# Patient Record
Sex: MALE | Race: OTHER | HISPANIC OR LATINO | URBAN - METROPOLITAN AREA
[De-identification: names, ages, dates, MRNs, and addresses within clinical notes are randomized per-mention and may not be internally consistent; named-entity substitution may affect disease eponyms.]

---

## 2019-09-20 ENCOUNTER — INPATIENT (INPATIENT)
Facility: HOSPITAL | Age: 69
LOS: 0 days | Discharge: HOME | End: 2019-09-21
Attending: INTERNAL MEDICINE | Admitting: INTERNAL MEDICINE
Payer: MEDICARE

## 2019-09-20 VITALS
HEART RATE: 59 BPM | RESPIRATION RATE: 18 BRPM | TEMPERATURE: 98 F | OXYGEN SATURATION: 100 % | SYSTOLIC BLOOD PRESSURE: 138 MMHG | DIASTOLIC BLOOD PRESSURE: 69 MMHG

## 2019-09-20 DIAGNOSIS — I10 ESSENTIAL (PRIMARY) HYPERTENSION: ICD-10-CM

## 2019-09-20 DIAGNOSIS — Z29.9 ENCOUNTER FOR PROPHYLACTIC MEASURES, UNSPECIFIED: ICD-10-CM

## 2019-09-20 DIAGNOSIS — N17.9 ACUTE KIDNEY FAILURE, UNSPECIFIED: ICD-10-CM

## 2019-09-20 DIAGNOSIS — E78.5 HYPERLIPIDEMIA, UNSPECIFIED: ICD-10-CM

## 2019-09-20 DIAGNOSIS — N13.2 HYDRONEPHROSIS WITH RENAL AND URETERAL CALCULOUS OBSTRUCTION: ICD-10-CM

## 2019-09-20 LAB
ALBUMIN SERPL ELPH-MCNC: 4.1 G/DL — SIGNIFICANT CHANGE UP (ref 3.3–5)
ALP SERPL-CCNC: 89 U/L — SIGNIFICANT CHANGE UP (ref 40–120)
ALT FLD-CCNC: 13 U/L — SIGNIFICANT CHANGE UP (ref 4–41)
ANION GAP SERPL CALC-SCNC: 13 MMO/L — SIGNIFICANT CHANGE UP (ref 7–14)
APPEARANCE UR: CLEAR — SIGNIFICANT CHANGE UP
AST SERPL-CCNC: 14 U/L — SIGNIFICANT CHANGE UP (ref 4–40)
BACTERIA # UR AUTO: NEGATIVE — SIGNIFICANT CHANGE UP
BASE EXCESS BLDV CALC-SCNC: 4.3 MMOL/L — SIGNIFICANT CHANGE UP
BASOPHILS # BLD AUTO: 0.04 K/UL — SIGNIFICANT CHANGE UP (ref 0–0.2)
BASOPHILS NFR BLD AUTO: 0.4 % — SIGNIFICANT CHANGE UP (ref 0–2)
BILIRUB SERPL-MCNC: 0.5 MG/DL — SIGNIFICANT CHANGE UP (ref 0.2–1.2)
BILIRUB UR-MCNC: NEGATIVE — SIGNIFICANT CHANGE UP
BLOOD GAS VENOUS - CREATININE: 1.82 MG/DL — HIGH (ref 0.5–1.3)
BLOOD UR QL VISUAL: SIGNIFICANT CHANGE UP
BUN SERPL-MCNC: 27 MG/DL — HIGH (ref 7–23)
CALCIUM SERPL-MCNC: 9.8 MG/DL — SIGNIFICANT CHANGE UP (ref 8.4–10.5)
CHLORIDE BLDV-SCNC: 105 MMOL/L — SIGNIFICANT CHANGE UP (ref 96–108)
CHLORIDE SERPL-SCNC: 98 MMOL/L — SIGNIFICANT CHANGE UP (ref 98–107)
CO2 SERPL-SCNC: 27 MMOL/L — SIGNIFICANT CHANGE UP (ref 22–31)
COLOR SPEC: SIGNIFICANT CHANGE UP
CREAT ?TM UR-MCNC: 111.7 MG/DL — SIGNIFICANT CHANGE UP
CREAT SERPL-MCNC: 1.8 MG/DL — HIGH (ref 0.5–1.3)
EOSINOPHIL # BLD AUTO: 0.08 K/UL — SIGNIFICANT CHANGE UP (ref 0–0.5)
EOSINOPHIL NFR BLD AUTO: 0.8 % — SIGNIFICANT CHANGE UP (ref 0–6)
GAS PNL BLDV: 136 MMOL/L — SIGNIFICANT CHANGE UP (ref 136–146)
GLUCOSE BLDV-MCNC: 92 MG/DL — SIGNIFICANT CHANGE UP (ref 70–99)
GLUCOSE SERPL-MCNC: 98 MG/DL — SIGNIFICANT CHANGE UP (ref 70–99)
GLUCOSE UR-MCNC: NEGATIVE — SIGNIFICANT CHANGE UP
HCO3 BLDV-SCNC: 26 MMOL/L — SIGNIFICANT CHANGE UP (ref 20–27)
HCT VFR BLD CALC: 45.2 % — SIGNIFICANT CHANGE UP (ref 39–50)
HCT VFR BLDV CALC: 41.3 % — SIGNIFICANT CHANGE UP (ref 39–51)
HGB BLD-MCNC: 13.8 G/DL — SIGNIFICANT CHANGE UP (ref 13–17)
HGB BLDV-MCNC: 13.5 G/DL — SIGNIFICANT CHANGE UP (ref 13–17)
HYALINE CASTS # UR AUTO: NEGATIVE — SIGNIFICANT CHANGE UP
IMM GRANULOCYTES NFR BLD AUTO: 0.3 % — SIGNIFICANT CHANGE UP (ref 0–1.5)
KETONES UR-MCNC: NEGATIVE — SIGNIFICANT CHANGE UP
LACTATE BLDV-MCNC: 1.1 MMOL/L — SIGNIFICANT CHANGE UP (ref 0.5–2)
LEUKOCYTE ESTERASE UR-ACNC: SIGNIFICANT CHANGE UP
LYMPHOCYTES # BLD AUTO: 1.29 K/UL — SIGNIFICANT CHANGE UP (ref 1–3.3)
LYMPHOCYTES # BLD AUTO: 13.1 % — SIGNIFICANT CHANGE UP (ref 13–44)
MCHC RBC-ENTMCNC: 27.1 PG — SIGNIFICANT CHANGE UP (ref 27–34)
MCHC RBC-ENTMCNC: 30.5 % — LOW (ref 32–36)
MCV RBC AUTO: 88.6 FL — SIGNIFICANT CHANGE UP (ref 80–100)
MONOCYTES # BLD AUTO: 0.82 K/UL — SIGNIFICANT CHANGE UP (ref 0–0.9)
MONOCYTES NFR BLD AUTO: 8.3 % — SIGNIFICANT CHANGE UP (ref 2–14)
NEUTROPHILS # BLD AUTO: 7.58 K/UL — HIGH (ref 1.8–7.4)
NEUTROPHILS NFR BLD AUTO: 77.1 % — HIGH (ref 43–77)
NITRITE UR-MCNC: NEGATIVE — SIGNIFICANT CHANGE UP
NRBC # FLD: 0 K/UL — SIGNIFICANT CHANGE UP (ref 0–0)
PCO2 BLDV: 58 MMHG — HIGH (ref 41–51)
PH BLDV: 7.33 PH — SIGNIFICANT CHANGE UP (ref 7.32–7.43)
PH UR: 5.5 — SIGNIFICANT CHANGE UP (ref 5–8)
PLATELET # BLD AUTO: 321 K/UL — SIGNIFICANT CHANGE UP (ref 150–400)
PMV BLD: 10.9 FL — SIGNIFICANT CHANGE UP (ref 7–13)
PO2 BLDV: < 24 MMHG — LOW (ref 35–40)
POTASSIUM BLDV-SCNC: 4.5 MMOL/L — SIGNIFICANT CHANGE UP (ref 3.4–4.5)
POTASSIUM SERPL-MCNC: 4.7 MMOL/L — SIGNIFICANT CHANGE UP (ref 3.5–5.3)
POTASSIUM SERPL-SCNC: 4.7 MMOL/L — SIGNIFICANT CHANGE UP (ref 3.5–5.3)
PROT SERPL-MCNC: 7.6 G/DL — SIGNIFICANT CHANGE UP (ref 6–8.3)
PROT UR-MCNC: 10 — SIGNIFICANT CHANGE UP
RBC # BLD: 5.1 M/UL — SIGNIFICANT CHANGE UP (ref 4.2–5.8)
RBC # FLD: 13.2 % — SIGNIFICANT CHANGE UP (ref 10.3–14.5)
RBC CASTS # UR COMP ASSIST: HIGH (ref 0–?)
SAO2 % BLDV: 30 % — LOW (ref 60–85)
SODIUM SERPL-SCNC: 138 MMOL/L — SIGNIFICANT CHANGE UP (ref 135–145)
SODIUM UR-SCNC: 89 MMOL/L — SIGNIFICANT CHANGE UP
SP GR SPEC: 1.02 — SIGNIFICANT CHANGE UP (ref 1–1.04)
SQUAMOUS # UR AUTO: SIGNIFICANT CHANGE UP
UROBILINOGEN FLD QL: NORMAL — SIGNIFICANT CHANGE UP
WBC # BLD: 9.84 K/UL — SIGNIFICANT CHANGE UP (ref 3.8–10.5)
WBC # FLD AUTO: 9.84 K/UL — SIGNIFICANT CHANGE UP (ref 3.8–10.5)
WBC UR QL: HIGH (ref 0–?)

## 2019-09-20 PROCEDURE — 74176 CT ABD & PELVIS W/O CONTRAST: CPT | Mod: 26

## 2019-09-20 RX ORDER — FOLIC ACID 0.8 MG
1 TABLET ORAL
Qty: 0 | Refills: 0 | DISCHARGE

## 2019-09-20 RX ORDER — ATORVASTATIN CALCIUM 80 MG/1
1 TABLET, FILM COATED ORAL
Qty: 0 | Refills: 0 | DISCHARGE

## 2019-09-20 RX ORDER — ASPIRIN/CALCIUM CARB/MAGNESIUM 324 MG
1 TABLET ORAL
Qty: 0 | Refills: 0 | DISCHARGE

## 2019-09-20 RX ORDER — INFLUENZA VIRUS VACCINE 15; 15; 15; 15 UG/.5ML; UG/.5ML; UG/.5ML; UG/.5ML
0.5 SUSPENSION INTRAMUSCULAR ONCE
Refills: 0 | Status: COMPLETED | OUTPATIENT
Start: 2019-09-20 | End: 2019-09-21

## 2019-09-20 RX ORDER — CHOLECALCIFEROL (VITAMIN D3) 125 MCG
1 CAPSULE ORAL
Qty: 0 | Refills: 0 | DISCHARGE

## 2019-09-20 RX ORDER — HEPARIN SODIUM 5000 [USP'U]/ML
5000 INJECTION INTRAVENOUS; SUBCUTANEOUS EVERY 8 HOURS
Refills: 0 | Status: DISCONTINUED | OUTPATIENT
Start: 2019-09-20 | End: 2019-09-21

## 2019-09-20 RX ORDER — METOPROLOL TARTRATE 50 MG
1 TABLET ORAL
Qty: 0 | Refills: 0 | DISCHARGE

## 2019-09-20 RX ORDER — ASPIRIN/CALCIUM CARB/MAGNESIUM 324 MG
81 TABLET ORAL DAILY
Refills: 0 | Status: DISCONTINUED | OUTPATIENT
Start: 2019-09-20 | End: 2019-09-21

## 2019-09-20 RX ORDER — PANTOPRAZOLE SODIUM 20 MG/1
40 TABLET, DELAYED RELEASE ORAL
Refills: 0 | Status: DISCONTINUED | OUTPATIENT
Start: 2019-09-20 | End: 2019-09-21

## 2019-09-20 RX ORDER — SODIUM CHLORIDE 9 MG/ML
1000 INJECTION INTRAMUSCULAR; INTRAVENOUS; SUBCUTANEOUS
Refills: 0 | Status: DISCONTINUED | OUTPATIENT
Start: 2019-09-20 | End: 2019-09-21

## 2019-09-20 RX ORDER — AMLODIPINE BESYLATE 2.5 MG/1
1 TABLET ORAL
Qty: 0 | Refills: 0 | DISCHARGE

## 2019-09-20 RX ORDER — ATORVASTATIN CALCIUM 80 MG/1
10 TABLET, FILM COATED ORAL AT BEDTIME
Refills: 0 | Status: DISCONTINUED | OUTPATIENT
Start: 2019-09-20 | End: 2019-09-21

## 2019-09-20 RX ORDER — LISINOPRIL 2.5 MG/1
40 TABLET ORAL DAILY
Refills: 0 | Status: DISCONTINUED | OUTPATIENT
Start: 2019-09-20 | End: 2019-09-20

## 2019-09-20 RX ORDER — METOPROLOL TARTRATE 50 MG
100 TABLET ORAL DAILY
Refills: 0 | Status: DISCONTINUED | OUTPATIENT
Start: 2019-09-20 | End: 2019-09-21

## 2019-09-20 RX ORDER — AMLODIPINE BESYLATE 2.5 MG/1
10 TABLET ORAL DAILY
Refills: 0 | Status: DISCONTINUED | OUTPATIENT
Start: 2019-09-20 | End: 2019-09-21

## 2019-09-20 RX ADMIN — ATORVASTATIN CALCIUM 10 MILLIGRAM(S): 80 TABLET, FILM COATED ORAL at 21:09

## 2019-09-20 RX ADMIN — Medication 81 MILLIGRAM(S): at 18:16

## 2019-09-20 RX ADMIN — SODIUM CHLORIDE 75 MILLILITER(S): 9 INJECTION INTRAMUSCULAR; INTRAVENOUS; SUBCUTANEOUS at 14:41

## 2019-09-20 RX ADMIN — HEPARIN SODIUM 5000 UNIT(S): 5000 INJECTION INTRAVENOUS; SUBCUTANEOUS at 21:09

## 2019-09-20 RX ADMIN — Medication 100 MILLIGRAM(S): at 18:16

## 2019-09-20 RX ADMIN — AMLODIPINE BESYLATE 10 MILLIGRAM(S): 2.5 TABLET ORAL at 18:16

## 2019-09-20 NOTE — ED PROVIDER NOTE - CLINICAL SUMMARY MEDICAL DECISION MAKING FREE TEXT BOX
70 yo M PMHx HTN, HLD, s/p TURP 2010, sent to ED by Dr. Shepherd (nephrologist) for US finding of left hydronephrosis. pt asymptomatic. pt afebrile, VSS. Exam is normal. Will send ua, labs, and obtain CTAP without. Per Dr. Shepherd, will plan to admit patient. 68 yo M PMHx HTN, HLD, s/p TURP 2010, sent to ED by Dr. Shepherd (nephrologist) for US finding of left hydronephrosis. pt asymptomatic. pt afebrile, VSS. Exam is normal. Will send ua, labs, and obtain CTAP without. Plan for possible admission

## 2019-09-20 NOTE — CONSULT NOTE ADULT - SUBJECTIVE AND OBJECTIVE BOX
[facility]   MD YADIRA LINN, DO DAJUAN FARMER, AYE GROSSMAN    TEL:  OFFICE: [phone]  DR RIOS CELL: [phone]  DR. MARTIN CELL: [phone]  DR. FARMER CELL: [phone]  DURGA JORGENSEN CELL: [phone]    HPI:  68 yo M with h/o HTN, BPH s/p TURP with primary urologist Dr. Phillips and known nephrolithiasis for many years sent by  for ROXANN and left hydro noted on outpatient US. baseline ~1 in 2019 referred by PMD for nephrology eval when recent lab showed scr 1.8, UA relatively blend. HTN taking rampril which was stopped last week by dr. rios.   currently pt denied any complaints.     Allergies:  No Known Allergies      PAST MEDICAL & SURGICAL HISTORY:      Home Medications Reviewed    Hospital Medications:   MEDICATIONS  (STANDING):      SOCIAL HISTORY:  Denies ETOh, Smoking,     FAMILY HISTORY:      REVIEW OF SYSTEMS:  CONSTITUTIONAL: No weakness, fevers or chills  EYES/ENT: No visual changes;  No vertigo or throat pain   NECK: No pain or stiffness  RESPIRATORY: No cough, wheezing, hemoptysis; No shortness of breath  CARDIOVASCULAR: No chest pain or palpitations.  GASTROINTESTINAL: No abdominal or epigastric pain. No nausea, vomiting, or hematemesis; No diarrhea or constipation. No melena or hematochezia.  GENITOURINARY: No dysuria, frequency, foamy urine, urinary urgency, incontinence or hematuria  NEUROLOGICAL: No numbness or weakness  SKIN: No itching, burning, rashes, or lesions   VASCULAR: No bilateral lower extremity edema.   All other review of systems is negative unless indicated above.    VITALS:  T(F): 97.9 (19 @ 09:20), Max: 97.9 (19 @ 09:20)  HR: 57 (19 @ 10:05)  BP: 134/63 (19 @ 10:05)  RR: 18 (19 @ 10:05)  SpO2: 98% (19 @ 10:05)  Wt(kg): --        PHYSICAL EXAM:  Constitutional: NAD  HEENT: anicteric sclera, oropharynx clear, MMM  Neck: No JVD  Respiratory: CTAB, no wheezes, rales or rhonchi  Cardiovascular: S1, S2, RRR  Gastrointestinal: BS+, soft, NT/ND  Extremities: No cyanosis or clubbing. No peripheral edema  Neurological: A/O x 3, no focal deficits  Psychiatric: Normal mood, normal affect  : No CVA tenderness. No eng.   Skin: No rashes      LABS:      138  |  98  |  27<H>  ----------------------------<  98  4.7   |  27  |  1.80<H>    Ca    9.8      20 Sep 2019 10:00    TPro  7.6  /  Alb  4.1  /  TBili  0.5  /  DBili      /  AST  14  /  ALT  13  /  AlkPhos  89      Creatinine Trend: 1.80 <--                        13.8   9.84  )-----------( 321      ( 20 Sep 2019 10:00 )             45.2     Urine Studies:  Urinalysis Basic - ( 20 Sep 2019 10:00 )    Color: LIGHT YELLOW / Appearance: CLEAR / S.018 / pH: 5.5  Gluc: NEGATIVE / Ketone: NEGATIVE  / Bili: NEGATIVE / Urobili: NORMAL   Blood: TRACE / Protein: 10 / Nitrite: NEGATIVE   Leuk Esterase: MODERATE / RBC: 6-10 / WBC 26-50   Sq Epi: OCC / Non Sq Epi:  / Bacteria: NEGATIVE          RADIOLOGY & ADDITIONAL STUDIES:

## 2019-09-20 NOTE — CONSULT NOTE ADULT - SUBJECTIVE AND OBJECTIVE BOX
HPI  This is 68 yo M with h/o BPH s/p TURP with primary urologist Dr. Phillips and known nephrolithiasis followed with surveillance presenting with elevated creatinine with L sided 2.7cm UPJ stone.  Pt was seen by primary care who noticed elevated Cr of 1.8 (from baseline of 1.0) who referred to nephrologist who noted hydronephrosis on left after renal sono.   Pt asymptomatic, no fevers chills, n/v/d, SOB, Pain, urinary symptoms.        PAST MEDICAL & SURGICAL HISTORY:      MEDICATIONS  (STANDING):    MEDICATIONS  (PRN):      FAMILY HISTORY:      Allergies    No Known Allergies    Intolerances        SOCIAL HISTORY:    REVIEW OF SYSTEMS: Otherwise negative as stated in HPI    Physical Exam  Vital signs  T(C): 36.6 (19 @ 09:20), Max: 36.6 (19 @ 09:20)  HR: 57 (19 @ 10:05)  BP: 134/63 (19 @ 10:05)  SpO2: 98% (19 @ 10:05)  Wt(kg): --    Output      Gen:  NAD    Pulm:  No respiratory distress  	  CV:  RRR    GI:  S/ND/NT No CVA          MSK: Full strength and ROM      LABS:       @ 10:00    WBC 9.84  / Hct 45.2  / SCr 1.80         138  |  98  |  27<H>  ----------------------------<  98  4.7   |  27  |  1.80<H>    Ca    9.8      20 Sep 2019 10:00    TPro  7.6  /  Alb  4.1  /  TBili  0.5  /  DBili  x   /  AST  14  /  ALT  13  /  AlkPhos  89        Urinalysis Basic - ( 20 Sep 2019 10:00 )    Color: LIGHT YELLOW / Appearance: CLEAR / S.018 / pH: 5.5  Gluc: NEGATIVE / Ketone: NEGATIVE  / Bili: NEGATIVE / Urobili: NORMAL   Blood: TRACE / Protein: 10 / Nitrite: NEGATIVE   Leuk Esterase: MODERATE / RBC: 6-10 / WBC 26-50   Sq Epi: OCC / Non Sq Epi: x / Bacteria: NEGATIVE        Urine Cx: pend      RADIOLOGY:  < from: CT Abdomen and Pelvis No Cont (19 @ 10:28) >  A 2.7 cm calculus within the left UPJ resulting in severe left   hydronephrosis.  An indeterminate low-attenuation lesion arising from the lower pole of   right kidney for which a renal ultrasound is advised on outpatient basis.    < end of copied text >

## 2019-09-20 NOTE — H&P ADULT - PROBLEM SELECTOR PLAN 2
Trend BUN/Cr  2.7cm kidney stone  asymptomatic  F/U and trend BUN/Cr for now  nehrology eval appreciated   IV hydration  Hold ACEi for now   can resume when level stable

## 2019-09-20 NOTE — ED PROVIDER NOTE - OBJECTIVE STATEMENT
68 yo M PMHx HTN, HLD, s/p TURP 2010, presents to ED, sent by nephrologist for an ultrasound 68 yo M PMHx HTN, HLD, s/p TURP 2010, presents to ED sent by Dr. Antoine Shepherd (nephrologist) for a left sided hydronephrosis (US done 3 days ago). Pt also noted to have elevated creatinine of 1.88 (up from 1.0 6 months ago). Pt is asymptomatic. States he is making adequate urine. Denies any abdominal pain, dysuria, hematuria, n/v/d, cp, sob, fever, or back  pain. 70 yo M PMHx HTN, HLD, s/p TURP 2010, presents to ED sent by Dr. Antoine Shepherd (nephrologist) for a left sided hydronephrosis (US done 3 days ago). Pt also noted to have elevated creatinine of 1.88 (up from 1.0 6 months ago). Pt is currently asymptomatic however, did endorse that he had vague abd and lower back pain about 3 days ago that resolves on its own. States he is making adequate urine. Denies any abdominal pain, dysuria, hematuria, n/v/d, cp, sob, fever, or back  pain.

## 2019-09-20 NOTE — CONSULT NOTE ADULT - ASSESSMENT
70 yo M with h/o HTN, BPH s/p TURP sent by nephrologist for roxann and left hydro noted on US    ROXANN  scr ~1 in feb now 1.8  ? etiology of ROXANN  US with left hydro, CT A/P w/o done 2.7 cm calculus within the left UPJ resulting in severe left hydronephrosis.   following, plan for outpatient follow up  only left side hydro does not explain ROXANN  check urine cr, na   ramipril on hold since last week  seems euvolemic on exam will give a trail of gentle hydration with NS@ 75cc/hr x 12hrs  monitor bmp  avoid NSAIDs, nephrotoxic agents      HTN  controlled  monitor    proteinuria/hematuria  likely sec to kidney stone  check urine p/c ratio  monitor 68 yo M with h/o HTN, BPH s/p TURP sent by nephrologist for roxann and left hydro noted on US    ROXANN  scr ~1 in feb now 1.8  ? etiology of ROXANN  US with left hydro, CT A/P w/o done 2.7 cm calculus within the left UPJ resulting in severe left hydronephrosis.   following  check urine cr, na   ramipril on hold since last week  seems euvolemic on exam will give a trail of gentle hydration with NS@ 75cc/hr x 12hrs  monitor bmp  avoid NSAIDs, nephrotoxic agents      HTN  controlled  monitor    proteinuria/hematuria  likely sec to kidney stone  check urine p/c ratio  monitor 70 yo M with h/o HTN, BPH s/p TURP sent by nephrologist for roxann and left hydro noted on US    ROXANN  scr ~1 in feb 2019 now elevated to 1.8  ? etiology of ROXANN  US with left hydro, CT A/P w/o done 2.7 cm calculus within the left UPJ resulting in severe left hydronephrosis.   following  check urine cr, na   ramipril on hold since last week  seems euvolemic on exam will give a trail of gentle hydration with NS@ 75cc/hr x 12hrs  monitor bmp  avoid NSAIDs, nephrotoxic agents      HTN  controlled  monitor    Proteinuria/hematuria  likely sec to kidney stone  check urine p/c ratio  monitor

## 2019-09-20 NOTE — ED PROVIDER NOTE - ATTENDING CONTRIBUTION TO CARE
Dr. Pinzon:  I have personally performed a face to face bedside history and physical examination of this patient. I have discussed the history, examination, review of systems, assessment and plan of management with the resident. I have reviewed the electronic medical record and amended it to reflect my history, review of systems, physical exam, assessment and plan.    69M h/o HTN, HLD, s/p TURP 2010, sent in by nephrologist (Dr. Shepherd) for concern of "left kidney blocked" on outpatient ultrasound last week.  Also, told creatinine 1.88 and previously was 1.0 six months prior.  Patient denies any symptoms.      Exam:  - nad  - rrr  - ctab   -abd soft ntnd    A/P  - elevated creatinine and ?hydronephrosis  - basic labs, US renal, ua, urine culture

## 2019-09-20 NOTE — ED PROVIDER NOTE - PROGRESS NOTE DETAILS
Spoke with Dr. Shepherd (nephrologist) who requests a CTAP non-con and requests admission to Dr. Mendoza. Will contact Dr. Mendoza once creatinine returns. Spoke with Dr. Shepherd (nephrologist) who requests a CTAP non-con and possible admission Sukumar Dia PGY1 - urologist came to see patient. awaiting final recs Sukumar Dia PGY1 - discussed results of labs and CT w/ patient. pt comfortable and does not have pain. Urologist came to see patient. awaiting final recs

## 2019-09-20 NOTE — H&P ADULT - ASSESSMENT
Pt is a 70 yo M PMHx HTN, HLD, s/p TURP 2010, presents to ED sent by Dr. Antoine Shepherd (nephrologist) for a left sided hydronephrosis (US done 3 days ago). Pt also noted to have elevated creatinine of 1.88 (up from 1.0 6 months ago). Pt is currently asymptomatic however, did endorse that he had vague abd and lower back pain about 3 days ago that resolves on its own. States he is making adequate urine. Denies any abdominal pain, dysuria, hematuria, n/v/d, cp, sob, fever, or back  pain. found to have 2.7cm Kidney stone in CT imaging

## 2019-09-20 NOTE — ED ADULT TRIAGE NOTE - CHIEF COMPLAINT QUOTE
Pt sent in by his nephrologist for elevated renal function. pt states had u/s this past Tuesday showing possible left kidney blockage.  Pt denies any pain or any difficulty urinating. Pt with HX of TURP 2010.

## 2019-09-20 NOTE — ED PROVIDER NOTE - CARE PLAN
Principal Discharge DX:	Hydronephrosis with urinary obstruction due to renal calculus  Secondary Diagnosis:	ROXANN (acute kidney injury)

## 2019-09-20 NOTE — H&P ADULT - NSHPPHYSICALEXAM_GEN_ALL_CORE
Vital Signs Last 24 Hrs  T(C): 36.6 (20 Sep 2019 09:20), Max: 36.6 (20 Sep 2019 09:20)  T(F): 97.9 (20 Sep 2019 09:20), Max: 97.9 (20 Sep 2019 09:20)  HR: 57 (20 Sep 2019 10:05) (57 - 59)  BP: 134/63 (20 Sep 2019 10:05) (134/63 - 138/69)  BP(mean): --  RR: 18 (20 Sep 2019 10:05) (18 - 18)  SpO2: 98% (20 Sep 2019 10:05) (98% - 100%)    Appearance: Normal	  HEENT:   Normal oral mucosa, PERRL, EOMI	  Lymphatic: No lymphadenopathy , no edema  Cardiovascular: Normal S1 S2, No JVD, No murmurs , Peripheral pulses palpable 2+ bilaterally  Respiratory: Lungs clear to auscultation, normal effort 	  Gastrointestinal:  Soft, Non-tender, + BS	  Skin: No rashes, No ecchymoses, No cyanosis, warm to touch  Musculoskeletal: Normal range of motion, normal strength  Psychiatry:  Mood & affect appropriate  Ext: No edema

## 2019-09-20 NOTE — H&P ADULT - NSHPLABSRESULTS_GEN_ALL_CORE
13.8   9.84  )-----------( 321      ( 20 Sep 2019 10:00 )             45.2               09    138  |  98  |  27<H>  ----------------------------<  98  4.7   |  27  |  1.80<H>    Ca    9.8      20 Sep 2019 10:00    TPro  7.6  /  Alb  4.1  /  TBili  0.5  /  DBili  x   /  AST  14  /  ALT  13  /  AlkPhos  89                         Urinalysis Basic - ( 20 Sep 2019 10:00 )    Color: LIGHT YELLOW / Appearance: CLEAR / S.018 / pH: 5.5  Gluc: NEGATIVE / Ketone: NEGATIVE  / Bili: NEGATIVE / Urobili: NORMAL   Blood: TRACE / Protein: 10 / Nitrite: NEGATIVE   Leuk Esterase: MODERATE / RBC: 6-10 / WBC 26-50   Sq Epi: OCC / Non Sq Epi: x / Bacteria: NEGATIVE    < from: CT Abdomen and Pelvis No Cont (19 @ 10:28) >    IMPRESSION:     A 2.7 cm calculus within the left UPJ resulting in severe left   hydronephrosis.  An indeterminate low-attenuation lesion arising from the lower pole of   right kidney for which a renal ultrasound is advised on outpatient basis.

## 2019-09-20 NOTE — H&P ADULT - HISTORY OF PRESENT ILLNESS
Pt is a 68 yo M PMHx HTN, HLD, s/p TURP 2010, presents to ED sent by Dr. Antoine Shepherd (nephrologist) for a left sided hydronephrosis (US done 3 days ago). Pt also noted to have elevated creatinine of 1.88 (up from 1.0 6 months ago). Pt is currently asymptomatic however, did endorse that he had vague abd and lower back pain about 3 days ago that resolves on its own. States he is making adequate urine. Denies any abdominal pain, dysuria, hematuria, n/v/d, cp, sob, fever, or back  pain. found to have 2.7cm Kidney stone in CT imaging

## 2019-09-20 NOTE — ED ADULT NURSE NOTE - OBJECTIVE STATEMENT
Pt presents to rm 23, A&Ox4, ambulatory w/o assistance, pmhx TURP in 2010, sent by PCP for US result that shows "kidney blockage." Pt states he has no symptoms, states no discharge or blood in urine. Denies abd pain, chest pain, shortness of breath, palpitations, diaphoresis, headaches, fevers, dizziness, nausea, vomiting, diarrhea, or urinary symptoms at this time. IV established in left AC with a 20G, labs drawn and sent, call bell in reach, warm blanket provided, bed in lowest position, side rails up x2, MD evaluation in progress. Will continue to monitor.

## 2019-09-21 ENCOUNTER — TRANSCRIPTION ENCOUNTER (OUTPATIENT)
Age: 69
End: 2019-09-21

## 2019-09-21 VITALS
TEMPERATURE: 98 F | OXYGEN SATURATION: 98 % | RESPIRATION RATE: 17 BRPM | DIASTOLIC BLOOD PRESSURE: 66 MMHG | HEART RATE: 51 BPM | SYSTOLIC BLOOD PRESSURE: 126 MMHG

## 2019-09-21 LAB
ALBUMIN SERPL ELPH-MCNC: 3.9 G/DL — SIGNIFICANT CHANGE UP (ref 3.3–5)
ALP SERPL-CCNC: 84 U/L — SIGNIFICANT CHANGE UP (ref 40–120)
ALT FLD-CCNC: 12 U/L — SIGNIFICANT CHANGE UP (ref 4–41)
ANION GAP SERPL CALC-SCNC: 14 MMO/L — SIGNIFICANT CHANGE UP (ref 7–14)
AST SERPL-CCNC: 14 U/L — SIGNIFICANT CHANGE UP (ref 4–40)
BACTERIA UR CULT: SIGNIFICANT CHANGE UP
BILIRUB SERPL-MCNC: 0.6 MG/DL — SIGNIFICANT CHANGE UP (ref 0.2–1.2)
BUN SERPL-MCNC: 26 MG/DL — HIGH (ref 7–23)
CALCIUM SERPL-MCNC: 9.7 MG/DL — SIGNIFICANT CHANGE UP (ref 8.4–10.5)
CHLORIDE SERPL-SCNC: 100 MMOL/L — SIGNIFICANT CHANGE UP (ref 98–107)
CHOLEST SERPL-MCNC: 116 MG/DL — LOW (ref 120–199)
CO2 SERPL-SCNC: 24 MMOL/L — SIGNIFICANT CHANGE UP (ref 22–31)
CREAT SERPL-MCNC: 1.72 MG/DL — HIGH (ref 0.5–1.3)
GLUCOSE SERPL-MCNC: 91 MG/DL — SIGNIFICANT CHANGE UP (ref 70–99)
HBA1C BLD-MCNC: 5.8 % — HIGH (ref 4–5.6)
HCT VFR BLD CALC: 42.4 % — SIGNIFICANT CHANGE UP (ref 39–50)
HCV AB S/CO SERPL IA: 0.28 S/CO — SIGNIFICANT CHANGE UP (ref 0–0.99)
HCV AB SERPL-IMP: SIGNIFICANT CHANGE UP
HDLC SERPL-MCNC: 35 MG/DL — SIGNIFICANT CHANGE UP (ref 35–55)
HGB BLD-MCNC: 13.2 G/DL — SIGNIFICANT CHANGE UP (ref 13–17)
LIPID PNL WITH DIRECT LDL SERPL: 70 MG/DL — SIGNIFICANT CHANGE UP
MCHC RBC-ENTMCNC: 27 PG — SIGNIFICANT CHANGE UP (ref 27–34)
MCHC RBC-ENTMCNC: 31.1 % — LOW (ref 32–36)
MCV RBC AUTO: 86.7 FL — SIGNIFICANT CHANGE UP (ref 80–100)
NRBC # FLD: 0 K/UL — SIGNIFICANT CHANGE UP (ref 0–0)
PLATELET # BLD AUTO: 287 K/UL — SIGNIFICANT CHANGE UP (ref 150–400)
PMV BLD: 10.9 FL — SIGNIFICANT CHANGE UP (ref 7–13)
POTASSIUM SERPL-MCNC: 4.9 MMOL/L — SIGNIFICANT CHANGE UP (ref 3.5–5.3)
POTASSIUM SERPL-SCNC: 4.9 MMOL/L — SIGNIFICANT CHANGE UP (ref 3.5–5.3)
PROT SERPL-MCNC: 7.3 G/DL — SIGNIFICANT CHANGE UP (ref 6–8.3)
RBC # BLD: 4.89 M/UL — SIGNIFICANT CHANGE UP (ref 4.2–5.8)
RBC # FLD: 13.4 % — SIGNIFICANT CHANGE UP (ref 10.3–14.5)
SODIUM SERPL-SCNC: 138 MMOL/L — SIGNIFICANT CHANGE UP (ref 135–145)
SPECIMEN SOURCE: SIGNIFICANT CHANGE UP
TRIGL SERPL-MCNC: 109 MG/DL — SIGNIFICANT CHANGE UP (ref 10–149)
TSH SERPL-MCNC: 2.29 UIU/ML — SIGNIFICANT CHANGE UP (ref 0.27–4.2)
WBC # BLD: 7.59 K/UL — SIGNIFICANT CHANGE UP (ref 3.8–10.5)
WBC # FLD AUTO: 7.59 K/UL — SIGNIFICANT CHANGE UP (ref 3.8–10.5)

## 2019-09-21 PROCEDURE — 99221 1ST HOSP IP/OBS SF/LOW 40: CPT

## 2019-09-21 RX ORDER — POLYETHYLENE GLYCOL 3350 17 G/17G
17 POWDER, FOR SOLUTION ORAL
Qty: 0 | Refills: 0 | DISCHARGE
Start: 2019-09-21

## 2019-09-21 RX ORDER — RAMIPRIL 5 MG
1 CAPSULE ORAL
Qty: 0 | Refills: 0 | DISCHARGE

## 2019-09-21 RX ORDER — DOCUSATE SODIUM 100 MG
100 CAPSULE ORAL
Refills: 0 | Status: DISCONTINUED | OUTPATIENT
Start: 2019-09-21 | End: 2019-09-21

## 2019-09-21 RX ORDER — DOCUSATE SODIUM 100 MG
1 CAPSULE ORAL
Qty: 0 | Refills: 0 | DISCHARGE
Start: 2019-09-21

## 2019-09-21 RX ORDER — POLYETHYLENE GLYCOL 3350 17 G/17G
17 POWDER, FOR SOLUTION ORAL AT BEDTIME
Refills: 0 | Status: DISCONTINUED | OUTPATIENT
Start: 2019-09-21 | End: 2019-09-21

## 2019-09-21 RX ADMIN — PANTOPRAZOLE SODIUM 40 MILLIGRAM(S): 20 TABLET, DELAYED RELEASE ORAL at 06:45

## 2019-09-21 RX ADMIN — Medication 81 MILLIGRAM(S): at 14:03

## 2019-09-21 RX ADMIN — Medication 100 MILLIGRAM(S): at 06:45

## 2019-09-21 RX ADMIN — INFLUENZA VIRUS VACCINE 0.5 MILLILITER(S): 15; 15; 15; 15 SUSPENSION INTRAMUSCULAR at 13:53

## 2019-09-21 RX ADMIN — HEPARIN SODIUM 5000 UNIT(S): 5000 INJECTION INTRAVENOUS; SUBCUTANEOUS at 06:45

## 2019-09-21 RX ADMIN — AMLODIPINE BESYLATE 10 MILLIGRAM(S): 2.5 TABLET ORAL at 06:45

## 2019-09-21 NOTE — DISCHARGE NOTE PROVIDER - HOSPITAL COURSE
69M with large kidney stone, mild ROXANN. CT A/P showed: A 2.7 cm calculus within the left UPJ resulting in severe left  hydronephrosis. An indeterminate low-attenuation lesion arising from the lower pole of right kidney for which a renal ultrasound is advised on outpatient basis.    Pt will need to schedule PCNL-pt will need to follow up Dr. Hoenig or Dr. Samson [phone]

## 2019-09-21 NOTE — DISCHARGE NOTE PROVIDER - CARE PROVIDERS DIRECT ADDRESSES
,[email or web address]_Unknown

## 2019-09-21 NOTE — DISCHARGE NOTE PROVIDER - PROVIDER TOKENS
PROVIDER:[TOKEN:[[id number]]] PROVIDER:[TOKEN:[[id number]]],PROVIDER:[TOKEN:[[id number]]]

## 2019-09-21 NOTE — PROGRESS NOTE ADULT - SUBJECTIVE AND OBJECTIVE BOX
Doing well, minimal pain, no fevers or chills or urinary symptoms    T(F): 98, Max: 98.7 (09-20-19 @ 17:44)  HR: 51  BP: 126/66  SpO2: 98%    Gen NAD  Abd soft, NTND   no CVAT      09-20 @ 10:00    WBC 9.84  / Hct 45.2  / SCr 1.80

## 2019-09-21 NOTE — DISCHARGE NOTE PROVIDER - NSDCCPCAREPLAN_GEN_ALL_CORE_FT
PRINCIPAL DISCHARGE DIAGNOSIS  Diagnosis: Hydronephrosis with urinary obstruction due to renal calculus  Assessment and Plan of Treatment: CT scan showed: 2.7 cm calculus within the left UPJ resulting in severe left hydronephrosis.  -outpatient urology follow-up to w/Dr. Hoenig or Dr. Samson [phone]        SECONDARY DISCHARGE DIAGNOSES  Diagnosis: ROXANN (acute kidney injury)  Assessment and Plan of Treatment: Avoid dehydration, Hold Lisinopril PRINCIPAL DISCHARGE DIAGNOSIS  Diagnosis: Hydronephrosis with urinary obstruction due to renal calculus  Assessment and Plan of Treatment: CT scan showed: 2.7 cm calculus within the left UPJ resulting in severe left hydronephrosis.  -outpatient urology follow-up to w/Dr. Hoenig or Dr. Samson [phone]        SECONDARY DISCHARGE DIAGNOSES  Diagnosis: Renal lesion  Assessment and Plan of Treatment: incidental findings of An indeterminate low-attenuation lesion arising from the lower pole of right kidney for which a renal ultrasound is advised on outpatient basis.  Please discuss with your nephrologist and /or urologist for further management       Diagnosis: ROXANN (acute kidney injury)  Assessment and Plan of Treatment: Avoid dehydration, Hold Lisinopril

## 2019-09-21 NOTE — DISCHARGE NOTE PROVIDER - CARE PROVIDER_API CALL
Nathaniel Samson)  Urology  [address] [place] [address]  Phone: [phone]  Fax: [phone]  Follow Up Time: Nathaniel Samson)  Urology  [address] [facility], [address]  [place] [address]  Phone: [phone]  Fax: [phone]  Follow Up Time:     Antoine Shepherd)  Internal Medicine; Nephrology  [address], [address] floor  [place] [id number]  Phone: [phone]  Fax: [phone]  Follow Up Time:

## 2019-09-21 NOTE — PROGRESS NOTE ADULT - ATTENDING COMMENTS
Pt seen and examined this am. Has mild ROXANN likely 2/2 obstructive etiology in combo with ACEI use (now stopped). Pt with asymptomatic large stone. needs definitive stone tx with percutaneous nephrolithotomy. Discussed decompression with perc nephrostomy tube but with mild ROXANN and no sx and no concern for infection, would favor close follow-up and early surgical planning for intervention. Awaiting BMP today. If stable/improved, recommend outpatient follow-up next week with his urologist or Dr Samson or Dr Hoenig ([phone]) for surgical planning for L PCNL. pt agrees

## 2019-09-21 NOTE — DISCHARGE NOTE NURSING/CASE MANAGEMENT/SOCIAL WORK - PATIENT PORTAL LINK FT
You can access the FollowMyHealth Patient Portal offered by [facility] by registering at the following website: [email or web address]. By joining [facility]’s FollowMyHealth portal, you will also be able to view your health information using other applications (apps) compatible with our system.

## 2019-09-21 NOTE — PROGRESS NOTE ADULT - ASSESSMENT
69M with large kidney stone, mild chikis    - recheck labs, if stable or improved would discharge home with outpatient urology follow-up to schedule PCNL  - discussed surgery with patient, gave contact information for Dr. Hoenig or Dr. Samson [phone]  - seen and examined with Dr. Conde

## 2019-09-21 NOTE — DISCHARGE NOTE PROVIDER - NSDCFUADDAPPT_GEN_ALL_CORE_FT
CT A/P showed: A 2.7 cm calculus within the left UPJ resulting in severe left  hydronephrosis. An indeterminate low-attenuation lesion arising from the lower pole of right kidney for which a renal ultrasound is advised on outpatient basis.

## 2021-03-30 NOTE — ED ADULT TRIAGE NOTE - ACCOMPANIED BY
Detail Level: Simple
Self
Additional Notes: Patient consent was obtained to proceed with the visit and recommended plan of care after discussion of all risks and benefits, including the risks of COVID-19 exposure.

## 2023-01-10 PROBLEM — Z00.00 ENCOUNTER FOR PREVENTIVE HEALTH EXAMINATION: Status: ACTIVE | Noted: 2023-01-10

## 2023-01-12 ENCOUNTER — NON-APPOINTMENT (OUTPATIENT)
Age: 73
End: 2023-01-12

## 2023-01-12 ENCOUNTER — APPOINTMENT (OUTPATIENT)
Dept: ELECTROPHYSIOLOGY | Facility: CLINIC | Age: 73
End: 2023-01-12
Payer: MEDICARE

## 2023-01-12 VITALS
WEIGHT: 201.5 LBS | DIASTOLIC BLOOD PRESSURE: 76 MMHG | HEART RATE: 57 BPM | HEIGHT: 68 IN | BODY MASS INDEX: 30.54 KG/M2 | OXYGEN SATURATION: 98 % | TEMPERATURE: 97.5 F | SYSTOLIC BLOOD PRESSURE: 139 MMHG

## 2023-01-12 DIAGNOSIS — Z87.898 PERSONAL HISTORY OF OTHER SPECIFIED CONDITIONS: ICD-10-CM

## 2023-01-12 DIAGNOSIS — Z78.9 OTHER SPECIFIED HEALTH STATUS: ICD-10-CM

## 2023-01-12 PROCEDURE — 99205 OFFICE O/P NEW HI 60 MIN: CPT | Mod: 25

## 2023-01-12 PROCEDURE — 93000 ELECTROCARDIOGRAM COMPLETE: CPT

## 2023-01-12 RX ORDER — ALLOPURINOL 100 MG/1
100 TABLET ORAL
Refills: 0 | Status: ACTIVE | COMMUNITY

## 2023-01-12 RX ORDER — LEVETIRACETAM 500 MG/1
500 TABLET, FILM COATED ORAL
Refills: 0 | Status: ACTIVE | COMMUNITY

## 2023-01-12 RX ORDER — METOPROLOL SUCCINATE 50 MG/1
50 TABLET, EXTENDED RELEASE ORAL
Refills: 0 | Status: ACTIVE | COMMUNITY

## 2023-01-12 RX ORDER — AMLODIPINE BESYLATE 10 MG/1
10 TABLET ORAL
Refills: 0 | Status: ACTIVE | COMMUNITY

## 2023-01-12 RX ORDER — HYDRALAZINE HYDROCHLORIDE 25 MG/1
25 TABLET ORAL DAILY
Refills: 0 | Status: ACTIVE | COMMUNITY

## 2023-01-12 RX ORDER — ATORVASTATIN CALCIUM 10 MG/1
10 TABLET, FILM COATED ORAL
Refills: 0 | Status: ACTIVE | COMMUNITY

## 2023-01-13 NOTE — DISCUSSION/SUMMARY
[EKG obtained to assist in diagnosis and management of assessed problem(s)] : EKG obtained to assist in diagnosis and management of assessed problem(s) [FreeTextEntry1] : Impression:\par \par 1. Syncope: EKG performed today to assess for presence of conduction disease and reveals NSR with first degree AV delay. Review of ECHO with normal LVEF. Given recent syncope with minimal prodromal, concern for possible bradyarrhythmia correlating with event. Recommend 30 day CardioNet to assess for presence of conduction disease and need for PPM. If CardioNet negative, consider ILR for long term monitoring. \par \par 2. HTN: resume oral antihypertensives as prescribed. Encouraged heart healthy diet, sodium restriction, and weight loss. Continue regular f/u with Cardiologist for further HTN management.\par \par 3. HLD: resume statin therapy as prescribed and regular f/u with Cardiologist for routine lipid monitoring and management.\par \par Plan for 30 day CardioNet.

## 2023-01-13 NOTE — HISTORY OF PRESENT ILLNESS
[FreeTextEntry1] : Brandt Hernandez is a 71y/o man with Hx of HTN, HLD, and recent episode of syncope who presents today for initial evaluation. Admits recently hospitalized at [place] after [place] the subway. Rec[place] being in the subway station and starting to feel lightheaded before he suddenly passed out and hit his head. Was started on keppra although no noted seizures during hospitalization. Has felt well since that time. Has had one other episode of near syncope when sitting down, felt dizzy but then after a few seconds resolved. Not on AV nodals. Denies chest pain, palpitations, SOB, and no further syncope or near syncope.\par

## 2023-01-17 NOTE — DISCHARGE NOTE NURSING/CASE MANAGEMENT/SOCIAL WORK - NURSING SECTION COMPLETE
Two patient identifiers verified.    Allergies reviewed.    Solu-Medrol 125 mg/2 mL administered IM to Left Hip per MD order.    Patient tolerated injection well; no redness, bleeding, or bruising noted to injection site.    Patient instructed to remain in clinic setting for 15 minutes. Verbalizes understanding.       Patient/Caregiver provided printed discharge information.

## 2023-03-02 ENCOUNTER — APPOINTMENT (OUTPATIENT)
Dept: ELECTROPHYSIOLOGY | Facility: CLINIC | Age: 73
End: 2023-03-02
Payer: MEDICARE

## 2023-03-02 VITALS
HEART RATE: 53 BPM | OXYGEN SATURATION: 96 % | HEIGHT: 68 IN | SYSTOLIC BLOOD PRESSURE: 136 MMHG | BODY MASS INDEX: 31.63 KG/M2 | DIASTOLIC BLOOD PRESSURE: 76 MMHG

## 2023-03-02 VITALS — BODY MASS INDEX: 31.63 KG/M2 | WEIGHT: 208 LBS

## 2023-03-02 DIAGNOSIS — E78.5 HYPERLIPIDEMIA, UNSPECIFIED: ICD-10-CM

## 2023-03-02 DIAGNOSIS — R55 SYNCOPE AND COLLAPSE: ICD-10-CM

## 2023-03-02 DIAGNOSIS — I10 ESSENTIAL (PRIMARY) HYPERTENSION: ICD-10-CM

## 2023-03-02 PROCEDURE — 99215 OFFICE O/P EST HI 40 MIN: CPT | Mod: 25

## 2023-03-02 PROCEDURE — 93228 REMOTE 30 DAY ECG REV/REPORT: CPT

## 2023-03-02 PROCEDURE — 93000 ELECTROCARDIOGRAM COMPLETE: CPT | Mod: 59

## 2023-03-03 ENCOUNTER — NON-APPOINTMENT (OUTPATIENT)
Age: 73
End: 2023-03-03

## 2023-03-03 PROBLEM — E78.5 HLD (HYPERLIPIDEMIA): Status: ACTIVE | Noted: 2023-01-12

## 2023-03-03 PROBLEM — I10 BENIGN ESSENTIAL HTN: Status: ACTIVE | Noted: 2023-01-12

## 2023-03-03 PROBLEM — R55 SYNCOPE AND COLLAPSE: Status: ACTIVE | Noted: 2023-01-12

## 2023-03-03 NOTE — DISCUSSION/SUMMARY
[FreeTextEntry1] : Impression:\par \par 1. Syncope: EKG performed today to assess for presence of conduction disease and reveals NSR with first degree AV delay. Review of ECHO with normal LVEF. Given recent syncope with minimal prodromal, concern for possible bradyarrhythmia correlating with event. Review of 30 day CardioNet without evidence of tachy or bradyarrhythmias. Asymptomatic when wearing. Recommend long term monitoring strategies such as ILR placement. Risks, benefits, and alternatives discussed. \par \par 2. HTN: resume oral antihypertensives as prescribed. Encouraged heart healthy diet, sodium restriction, and weight loss. Continue regular f/u with Cardiologist for further HTN management.\par \par 3. HLD: resume statin therapy as prescribed and regular f/u with Cardiologist for routine lipid monitoring and management.\par \par Plan for ILR placement.  [EKG obtained to assist in diagnosis and management of assessed problem(s)] : EKG obtained to assist in diagnosis and management of assessed problem(s)

## 2023-03-03 NOTE — HISTORY OF PRESENT ILLNESS
[FreeTextEntry1] : Brandt Hernandez is a 73y/o man with Hx of HTN, HLD, and recent episode of syncope who presents today for routine f/u post CardioNet monitoring. Admits recently hospitalized at [place] after an episode of syncope in the subway. Recalls being in the subway station and starting to feel lightheaded before he suddenly passed out and hit his head. Was started on keppra although no noted seizures during hospitalization. Has felt well since that time. Underwent 30 day CardioNet during which he was asymptomatic and without noted tachy or bradyarrhythmias. Denies chest pain, palpitations, SOB, and no further syncope or near syncope.\par \par

## 2023-10-22 NOTE — ED PROVIDER NOTE - NS ED ROS FT
Constitutional: no fevers or chills  HEENT: no visual changes, no sore throat, no rhinorrhea  CV: no cp or palpitations  Resp: no sob or cough  GI: no abd pain, no nausea, no vomiting, no diarrhea, no constipation  : no dysuria, no hematuria  MSK: no myalgais or arthralgias  skin: no rashes  neuro: no HA, no confusion; no numbness; no weakness, no tingling  heme: no LAD .